# Patient Record
(demographics unavailable — no encounter records)

---

## 2025-01-09 NOTE — HISTORY OF PRESENT ILLNESS
[de-identified] :  65 year old female presents for follow up for hearing loss Hx of Right tympanic membrane perforation, conductive hearing loss. s/p Right tympanoplasty with tissue graft, cartilage graft 2/27/24. States hearing has been stable. Occasionally has bilateral otalgia.  No longer having sensitivity to loud sounds. Patient denies otalgia, otorrhea, ear infections and tinnitus  Audiogram completed today

## 2025-01-09 NOTE — ASSESSMENT
[FreeTextEntry1] : Otoscopic exam shows intact well healed right tympanic membrane reconstruction.  Left tympanic membrane intact without effusion or retraction.  Bilateral facial nerve function normal.  I reviewed an audiogram which shows bilateral downsloping mostly sensorineural hearing loss.  Follow-up annually for ear cleaning, avoid Q-tip usage, patient does not need to maintain dry ear precautions, may use antihistamines as needed for ETD.

## 2025-01-09 NOTE — PROCEDURE
[FreeTextEntry3] : Procedure note:  Left cerumenectomy  Jan 09, 2025   Description of Procedure:  Cerumen impaction was noted requiring debridement under the operating microscope using otologic instrumentation.  The patient tolerated the procedure without complications.